# Patient Record
Sex: MALE | Race: AMERICAN INDIAN OR ALASKA NATIVE
[De-identification: names, ages, dates, MRNs, and addresses within clinical notes are randomized per-mention and may not be internally consistent; named-entity substitution may affect disease eponyms.]

---

## 2021-07-27 NOTE — XRAY REPORT
RIGHT HAND 3 VIEW(S)



INDICATION / CLINICAL INFORMATION: right index finger pain swelling erythema



COMPARISON: None available.

 

FINDINGS:



BONES / JOINT(S): No acute fracture or subluxation. No significant arthritis.



SOFT TISSUES: No significant abnormality.



ADDITIONAL FINDINGS: None.







Signer Name: Clay Marie MD 

Signed: 7/27/2021 9:20 PM

Workstation Name: [id number]

## 2021-07-27 NOTE — EMERGENCY DEPARTMENT REPORT
Upper Extremity





- HPI


Chief Complaint: Extremity Injury, Upper


Stated Complaint: RIGHT HAND INJURY


Time Seen by Provider: 21 22:13


Upper Extremity: Right Ring Finger


Occurred When: 3 Days


Mechanism: Hit with Object, Other (Times dailyRegion with the tip of his finger 

resulting in pain with flexion to the distal interphalangeal joint region)


Symptoms: Yes Pain with Movement, Yes Deformity, Yes Limited Range of Movement, 

Yes Swelling, No Numbness, No Weakness, No Bruising/Ecchymosis, No Laceration or

Abrasion





ED Review of Systems


ROS: 


Stated complaint: RIGHT HAND INJURY


Other details as noted in HPI





Comment: All other systems reviewed and negative





ED Past Medical Hx





- Medications


Home Medications: 


                                Home Medications











 Medication  Instructions  Recorded  Confirmed  Last Taken  Type


 


Ketorolac [Toradol] 10 mg PO Q6H PRN #14 tablet 21  Unknown Rx














Upper Extremity Exam





- Exam


General: 


Vital signs noted. No distress. Alert and acting appropriately.





Head and Torso: No HEENT Abnormality, No Neck Tenderness, No Chest/Lungs 

Abnormality, No Abdominal Tenderness, No Back Tenderness


Shoulder Exam: Yes Normal Range of Motion in Shoulder, No Shoulder Tenderness, 

No Clavicle Tenderness, No Shoulder Deformity, No AC Joint Tenderness


Arm Exam: No Arm/Humerus Tenderness, No Arm Deformity


Elbow: No Elbow Tenderness, No Normal Range of Motion in Elbow, No Elbow 

Deformity


Forearm: No Forearm Tenderness, No Forearm Deformity, No Pain with Pronation, No

 Pain with Supination


Wrist: Yes Normal ROM in Wrist, No Wrist Tenderness, No Wrist Deformity, No 

Snuffbox Tenderness, No Pain with Axial Thumb Compression


Hand: Yes Hand Tenderness, Yes Hand Deformity, Yes Normal ROM in Digit(s), Yes 

Digit(s) Deformity (At the distal interphalangeal joint region in a partially 

flexed position unable to extend), No Digit Tenderness, No Tendon Dysfunction


CMS Exam: No Broken Skin, No Normal Distal Pulses, No Normal Capillary Refill, 

No Normal Distal Sensation





ED Course


                                   Vital Signs











  21





  20:49


 


Temperature 98.8 F


 


Pulse Rate 63


 


Respiratory 18





Rate 


 


Blood Pressure 139/82





[Right] 


 


O2 Sat by Pulse 99





Oximetry 














ED Medical Decision Making





- Radiology Data


Radiology results: report reviewed





[facility]  


                                     [address] [place] [address]  


 


                                            XRay Report   


                                               Signed  


 


Patient: MARIBEL STAFFORD                                                           

     MR#: [id number]  


 


: 1970                                                                

Acct:[id number]      


 


Age/Sex: 51 / M                                                                

ADM Date: 21     


 


Loc: ED       


Attending Dr:   


 


 


Ordering Physician: JASMYN NOBLE NP  


Date of Service: 21  


Procedure(s): XR hand 3+V RT  


Accession Number(s): [id number]  


 


cc: JASMYN NOBLE NP   


 


Fluoro Time In Minutes:   


 


RIGHT HAND 3 VIEW(S)  


 


 INDICATION / CLINICAL INFORMATION: right index finger pain swelling erythema  


 


 COMPARISON: None available.  


 


 FINDINGS:  


 


 BONES / JOINT(S): No acute fracture or subluxation. No significant arthritis.  


 


 SOFT TISSUES: No significant abnormality.  


 


 ADDITIONAL FINDINGS: None.  


 


 


 


 Signer Name: Clay Marie MD   


 Signed: 2021 9:20 PM  


 Workstation Name: [id number]   


 


 


Transcribed By: SB  


Dictated By: CLAY MARIE MD  


Electronically Authenticated By: CLAY MARIE MD    


Signed Date/Time: 21                                


 


 


 


DD/DT: 21                                                            

  


TD/TT:





- Medical Decision Making





51-year-old male from his keys struck the tip of his finger which resulted in 

pain to the distal left phalangeal joint with some swelling a couple days ago 

which is not yet resolved.  Presents emerge department wearing a brace for the 

finger to be evaluated for possible fracture.


Critical care attestation.: 


If time is entered above; I have spent that time in minutes in the direct care 

of this critically ill patient, excluding procedure time.








ED Disposition


Clinical Impression: 


 Mallet deformity of right ring finger





Disposition: DC-01 TO HOME OR SELFCARE


Is pt being admited?: No


Does the pt Need Aspirin: No


Condition: Stable


Instructions:  Mallet Finger


Additional Instructions: 


Ice anti-inflammatories follow-up with with orthopedic


Prescriptions: 


Ketorolac [Toradol] 10 mg PO Q6H PRN #14 tablet


 PRN Reason: Pain


Referrals: 


[facility] MD [Primary Care Provider] - 3-5 Days


ARMEN BAIRES MD [Staff Physician] - 3-5 Days

## 2021-07-27 NOTE — EVENT NOTE
ED Screening Note


Date of service: 07/27/21


Time: 20:55


ED Screening Note: 


Patient 51-year-old black -American male who presents for right index 

finger pain swelling and deformity x3 days.  States he struck his finger on a 

metal pipe and now with swelling and erythema no drainage.  Patient describes 

throbbing as 7/10.





This initial assessment/diagnostic orders/clinical plan/treatment(s) is/are 

subject to change based on patients health status, clinical progression and re-

assessment by fellow clinical providers in the ED. Further treatment and workup 

at subsequent clinical providers discretion. Patient/guardian urged not to elope

from the ED as their condition may be serious if not clinically assessed and 

managed. 





Initial orders include:

## 2022-07-19 ENCOUNTER — HOSPITAL ENCOUNTER (OUTPATIENT)
Dept: HOSPITAL 5 - ED | Age: 52
Setting detail: OBSERVATION
LOS: 1 days | Discharge: HOME | End: 2022-07-20
Attending: STUDENT IN AN ORGANIZED HEALTH CARE EDUCATION/TRAINING PROGRAM | Admitting: INTERNAL MEDICINE
Payer: COMMERCIAL

## 2022-07-19 DIAGNOSIS — Z79.82: ICD-10-CM

## 2022-07-19 DIAGNOSIS — R07.89: ICD-10-CM

## 2022-07-19 DIAGNOSIS — I21.4: ICD-10-CM

## 2022-07-19 DIAGNOSIS — Z79.899: ICD-10-CM

## 2022-07-19 DIAGNOSIS — F17.210: ICD-10-CM

## 2022-07-19 DIAGNOSIS — I20.0: Primary | ICD-10-CM

## 2022-07-19 LAB
ALBUMIN SERPL-MCNC: 4 G/DL (ref 3.9–5)
ALT SERPL-CCNC: 15 UNITS/L (ref 7–56)
BASOPHILS # (AUTO): 0.1 K/MM3 (ref 0–0.1)
BASOPHILS NFR BLD AUTO: 1.5 % (ref 0–1.8)
BUN SERPL-MCNC: 8 MG/DL (ref 9–20)
BUN SERPL-MCNC: 9 MG/DL (ref 9–20)
BUN/CREAT SERPL: 10 %
BUN/CREAT SERPL: 11 %
CALCIUM SERPL-MCNC: 9.6 MG/DL (ref 8.4–10.2)
CALCIUM SERPL-MCNC: 9.8 MG/DL (ref 8.4–10.2)
EOSINOPHIL # BLD AUTO: 0 K/MM3 (ref 0–0.4)
EOSINOPHIL NFR BLD AUTO: 0.8 % (ref 0–4.3)
HCT VFR BLD CALC: 42.2 % (ref 35.5–45.6)
HCT VFR BLD CALC: 43.6 % (ref 35.5–45.6)
HDLC SERPL-MCNC: 83 MG/DL (ref 40–59)
HEMOLYSIS INDEX: 3
HEMOLYSIS INDEX: 4
HGB BLD-MCNC: 14.3 GM/DL (ref 11.8–15.2)
HGB BLD-MCNC: 14.8 GM/DL (ref 11.8–15.2)
LYMPHOCYTES # BLD AUTO: 2.4 K/MM3 (ref 1.2–5.4)
LYMPHOCYTES NFR BLD AUTO: 47.9 % (ref 13.4–35)
MCHC RBC AUTO-ENTMCNC: 34 % (ref 32–34)
MCHC RBC AUTO-ENTMCNC: 34 % (ref 32–34)
MCV RBC AUTO: 97 FL (ref 84–94)
MCV RBC AUTO: 99 FL (ref 84–94)
MONOCYTES # (AUTO): 0.7 K/MM3 (ref 0–0.8)
MONOCYTES % (AUTO): 13.9 % (ref 0–7.3)
PLATELET # BLD: 265 K/MM3 (ref 140–440)
PLATELET # BLD: 271 K/MM3 (ref 140–440)
RBC # BLD AUTO: 4.34 M/MM3 (ref 3.65–5.03)
RBC # BLD AUTO: 4.42 M/MM3 (ref 3.65–5.03)

## 2022-07-19 PROCEDURE — 85025 COMPLETE CBC W/AUTO DIFF WBC: CPT

## 2022-07-19 PROCEDURE — 85730 THROMBOPLASTIN TIME PARTIAL: CPT

## 2022-07-19 PROCEDURE — 96367 TX/PROPH/DG ADDL SEQ IV INF: CPT

## 2022-07-19 PROCEDURE — 93005 ELECTROCARDIOGRAM TRACING: CPT

## 2022-07-19 PROCEDURE — C8929 TTE W OR WO FOL WCON,DOPPLER: HCPCS

## 2022-07-19 PROCEDURE — 96376 TX/PRO/DX INJ SAME DRUG ADON: CPT

## 2022-07-19 PROCEDURE — 85610 PROTHROMBIN TIME: CPT

## 2022-07-19 PROCEDURE — 93306 TTE W/DOPPLER COMPLETE: CPT

## 2022-07-19 PROCEDURE — 99285 EMERGENCY DEPT VISIT HI MDM: CPT

## 2022-07-19 PROCEDURE — 84484 ASSAY OF TROPONIN QUANT: CPT

## 2022-07-19 PROCEDURE — C1894 INTRO/SHEATH, NON-LASER: HCPCS

## 2022-07-19 PROCEDURE — 96366 THER/PROPH/DIAG IV INF ADDON: CPT

## 2022-07-19 PROCEDURE — 96375 TX/PRO/DX INJ NEW DRUG ADDON: CPT

## 2022-07-19 PROCEDURE — 85007 BL SMEAR W/DIFF WBC COUNT: CPT

## 2022-07-19 PROCEDURE — 80048 BASIC METABOLIC PNL TOTAL CA: CPT

## 2022-07-19 PROCEDURE — 80053 COMPREHEN METABOLIC PANEL: CPT

## 2022-07-19 PROCEDURE — 71046 X-RAY EXAM CHEST 2 VIEWS: CPT

## 2022-07-19 PROCEDURE — 93458 L HRT ARTERY/VENTRICLE ANGIO: CPT

## 2022-07-19 PROCEDURE — 80061 LIPID PANEL: CPT

## 2022-07-19 PROCEDURE — 96365 THER/PROPH/DIAG IV INF INIT: CPT

## 2022-07-19 PROCEDURE — G0378 HOSPITAL OBSERVATION PER HR: HCPCS

## 2022-07-19 PROCEDURE — 36415 COLL VENOUS BLD VENIPUNCTURE: CPT

## 2022-07-19 PROCEDURE — 85520 HEPARIN ASSAY: CPT

## 2022-07-19 NOTE — ELECTROCARDIOGRAPH REPORT
[facility]

                                       

Test Date:    2022               Test Time:    05:30:41

Pat Name:     MARIBEL STAFFORD             Department:   

Patient ID:   [id number]          Room:          

Gender:       M                        Technician:   MB

:          1970               Requested By: ED DOC

Order Number: [id number]              Reading MD:   Santiago Cordoba

                                 Measurements

Intervals                              Axis          

Rate:         56                       P:            24

AK:           106                      QRS:          82

QRSD:         79                       T:            119

QT:           538                                    

QTc:          520                                    

                           Interpretive Statements

Sinus rhythm

Anteroseptal infarct, age indeterminate

Abnormal T, consider ischemia, lateral leads

Prolonged QT interval

No previous ECG available for comparison

Electronically Signed On 2022 18:58:26 EDT by Santiago Cordoba

## 2022-07-19 NOTE — HISTORY AND PHYSICAL REPORT
History of Present Illness


Date of examination: 07/19/22


Date of admission: 


07/19/2022


Chief complaint: 





Chest pain


History of present illness: 





52-year-old -American male with no significant past medical history 

presenting to the emergency room today complaining of chest pain.  Chest pain 

felt like pressure in the midsternal region radiating to both upper extremities.

 There has been no known relieving or exacerbating factor.  Denies any nausea or

vomiting and no abdominal pain.  Denies any headache or dizziness no 

diaphoresis.  Patient denies having similar symptoms in the past.





Patient admits that he smokes more than a pack of cigarettes on significant 

findings were that of 1 daily basis.  He has never had any cardiac work-up.





Work-up in the emergency room today, significant findings were slightly elevated

troponin of 0.77.  EKG shows some T wave depression in V2.


Chest x-ray was unremarkable.





Patient has been admitted for NSTEMI.





Past History


Past Medical History: No medical history


Past Surgical History: No surgical history


Social history: smoking (Current daily smoker)


Family history: no significant family history





Medications and Allergies


                                    Allergies











Allergy/AdvReac Type Severity Reaction Status Date / Time


 


No Known Allergies Allergy   Verified 07/27/21 21:57











                                Home Medications











 Medication  Instructions  Recorded  Confirmed  Last Taken  Type


 


Ketorolac [Toradol] 10 mg PO Q6H PRN #14 tablet 07/27/21  Unknown Rx











Active Meds: 


Active Medications





Acetaminophen (Acetaminophen 325 Mg Tab)  650 mg PO Q4H PRN


   PRN Reason: Pain MILD(1-3)/Fever >100.5/HA


Aspirin (Aspirin Ec 325 Mg Tab)  325 mg PO QDAY FIDELINA


Magnesium Hydroxide (Magnesium Hydroxide (Mom) Oral Liqd Udc)  30 ml PO Q4H PRN


   PRN Reason: Constipation


Morphine Sulfate (Morphine 2 Mg/1 Ml Inj)  2 mg IV Q4H PRN


   PRN Reason: Pain, Moderate (4-6)


Morphine Sulfate (Morphine 4 Mg/1 Ml Inj)  4 mg IV Q4H PRN


   PRN Reason: Pain , Severe (7-10)


Morphine Sulfate (Morphine 4 Mg/1 Ml Inj)  2 mg IV Q5MIN PRN


   PRN Reason: Chest Pain unrelieved by NTG


Nitroglycerin (Nitroglycerin 0.4 Mg Tab Subl)  0.4 mg SL Q5M PRN


   PRN Reason: Chest Pain


Ondansetron HCl (Ondansetron 4 Mg/2 Ml Inj)  4 mg IV Q8H PRN


   PRN Reason: Nausea And Vomiting


Sodium Chloride (Sodium Chloride 0.9% 10 Ml Flush Syringe)  10 ml IV BID FIDELINA


Sodium Chloride (Sodium Chloride 0.9% 10 Ml Flush Syringe)  10 ml IV PRN PRN


   PRN Reason: LINE FLUSH


Sodium Chloride (Sodium Chloride 0.9% 10 Ml Flush Syringe)  10 ml IV PRN PRN


   PRN Reason: LINE FLUSH


Tramadol HCl (Tramadol 50 Mg Tab)  50 mg PO Q6H PRN


   PRN Reason: Pain, Moderate (4-6)











Review of Systems


Constitutional: no fever, no chills


Ears, nose, mouth and throat: no nasal congestion, no sore throat


Cardiovascular: chest pain, no palpitations


Respiratory: no cough, no shortness of breath


Gastrointestinal: no abdominal pain, no nausea, no vomiting, no diarrhea


Genitourinary Male: no dysuria, no hematuria, no nocturia


Musculoskeletal: no neck pain, no low back pain


Integumentary: no rash, no pruritis


Psychiatric: no anxiety, no depression


Endocrine: no polyphagia, no polydipsia, no polyuria, no nocturia





Exam





- Constitutional


Vitals: 


                                        











Temp Pulse Resp BP Pulse Ox


 


 98.0 F   53 L  17   129/87   99 


 


 07/19/22 05:28  07/19/22 22:01  07/19/22 22:01  07/19/22 22:01  07/19/22 22:01











General appearance: Present: no acute distress, well-nourished





- EENT


Eyes: Present: PERRL, EOM intact.  Absent: scleral icterus


ENT: hearing intact, clear oral mucosa, dentition normal





- Neck


Neck: Present: supple, normal ROM





- Respiratory


Respiratory effort: normal


Respiratory: bilateral: CTA





- Cardiovascular


Rhythm: regular


Heart Sounds: Present: S1 & S2.  Absent: gallop, systolic murmur, diastolic 

murmur, rub, click





- Extremities


Extremities: no ischemia, pulses intact, pulses symmetrical, No edema, normal 

temperature, Full ROM


Peripheral Pulses: within normal limits





- Abdominal


General gastrointestinal: Present: soft, non-tender, non-distended, normal bowel

 sounds.  Absent: mass





- Integumentary


Integumentary: Present: clear, warm, dry, normal turgor.  Absent: rash





- Musculoskeletal


Musculoskeletal: strength equal bilaterally





- Psychiatric


Psychiatric: appropriate mood/affect, intact judgment & insight, memory intact, 

cooperative





- Neurologic


Neurologic: CNII-XII intact, no focal deficits, moves all extremities





HEART Score





- HEART Score


History: Moderately suspicious


EKG: Non-specific


Age: 45-65


Risk factors: 1-2 risk factors


Troponin: 


                                        











Troponin T  0.018 ng/mL (0.00-0.029)   07/19/22  17:41    











Troponin: 1-3x normal limit


HEART Score: 5





Results





- Labs


CBC & Chem 7: 


                                 07/19/22 23:07





                                 07/19/22 23:07


Labs: 


                              Abnormal lab results











  07/19/22 07/19/22 07/19/22 Range/Units





  06:04 06:04 08:29 


 


MCV  97 H    (84-94)  fl


 


MCH  33 H    (28-32)  pg


 


Lymph % (Auto)  47.9 H    (13.4-35.0)  %


 


Mono % (Auto)  13.9 H    (0.0-7.3)  %


 


Seg Neutrophils %  35.9 L    (40.0-70.0)  %


 


BUN   8 L   (9-20)  mg/dL


 


Glucose   108 H   ()  mg/dL


 


Troponin T   0.077 H  0.069 H  (0.00-0.029)  ng/mL


 


HDL Cholesterol   83 H   (40-59)  mg/dL














Assessment and Plan





Assessment:





1.  Chest pain





2.  NSTEMI





3.  Tobacco abuse








Plan:





1.  Patient admitted and placed on telemetry.  We will check serial cardiac 

enzymes.





2.  Patient will be scheduled for stress test and echocardiogram.





3.  Counseled on quitting tobacco use.





4.  Consult placed to cardiology for evaluation.








DVT prophylaxis: Patient currently on heparin drip.








CODE STATUS: Full code

## 2022-07-19 NOTE — EMERGENCY DEPARTMENT REPORT
ED Chest Pain HPI





- General


Chief Complaint: Chest Pain


Stated Complaint: CHEST PAIN


Time Seen by Provider: 07/19/22 20:49


Source: patient


Mode of arrival: Ambulatory


Limitations: No Limitations





- History of Present Illness


Initial Comments: 


Who presents with chest pain that has been going on for the last 3 days.  

Patient states chest pain is moderate is a pressure feeling on his chest he 

states that it radiates to his arms.  He has no nausea or vomiting.  He states 

he has never had chest pain like this before in the past.





Severity scale (0 -10): 6





- Related Data


                                  Previous Rx's











 Medication  Instructions  Recorded  Last Taken  Type


 


Ketorolac [Toradol] 10 mg PO Q6H PRN #14 tablet 07/27/21 Unknown Rx











                                    Allergies











Allergy/AdvReac Type Severity Reaction Status Date / Time


 


No Known Allergies Allergy   Verified 07/27/21 21:57














Heart Score





- HEART Score


History: Moderately suspicious


EKG: Non-specific


Age: 45-65


Risk factors: 1-2 risk factors


Troponin: 1-3x normal limit


HEART Score: 5





- EKG Read Time


Time EKG Completed: 10:00


EKG Read Time: 10:15





ED Review of Systems


ROS: 


Stated complaint: CHEST PAIN


Other details as noted in HPI





Constitutional: denies: chills, fever


Eyes: denies: eye pain, eye discharge, vision change


ENT: denies: ear pain, throat pain


Respiratory: denies: cough, shortness of breath, wheezing


Cardiovascular: chest pain.  denies: palpitations


Endocrine: no symptoms reported


Gastrointestinal: denies: abdominal pain, nausea, diarrhea


Genitourinary: denies: urgency, dysuria


Musculoskeletal: denies: back pain, joint swelling, arthralgia


Skin: denies: rash, lesions


Neurological: denies: headache, weakness, paresthesias


Psychiatric: denies: anxiety, depression


Hematological/Lymphatic: denies: easy bleeding, easy bruising





ED Past Medical Hx





- Medications


Home Medications: 


                                Home Medications











 Medication  Instructions  Recorded  Confirmed  Last Taken  Type


 


Ketorolac [Toradol] 10 mg PO Q6H PRN #14 tablet 07/27/21  Unknown Rx














ED Physical Exam





- General


Limitations: No Limitations


General appearance: alert, in no apparent distress





- Head


Head exam: Present: atraumatic, normocephalic





- Eye


Eye exam: Present: normal appearance





- ENT


ENT exam: Present: mucous membranes moist





- Neck


Neck exam: Present: normal inspection





- Respiratory


Respiratory exam: Present: normal lung sounds bilaterally.  Absent: respiratory 

distress





- Cardiovascular


Cardiovascular Exam: Present: regular rate, normal rhythm.  Absent: systolic 

murmur, diastolic murmur, rubs, gallop





- GI/Abdominal


GI/Abdominal exam: Present: soft, normal bowel sounds





- Rectal


Rectal exam: Present: deferred





- Extremities Exam


Extremities exam: Present: normal inspection





- Back Exam


Back exam: Present: normal inspection





- Neurological Exam


Neurological exam: Present: alert, oriented X3





- Psychiatric


Psychiatric exam: Present: normal affect, normal mood





- Skin


Skin exam: Present: warm, dry, intact, normal color.  Absent: rash





ED Course


                                   Vital Signs











  07/19/22 07/19/22 07/19/22





  05:28 19:21 19:31


 


Temperature 98.0 F  


 


Pulse Rate 62 64 62


 


Respiratory 20 22 19





Rate   


 


Blood Pressure 159/92  


 


O2 Sat by Pulse 100 100 99





Oximetry   














  07/19/22 07/19/22 07/19/22





  19:45 20:01 20:15


 


Temperature   


 


Pulse Rate 68 65 65


 


Respiratory 17 14 15





Rate   


 


Blood Pressure  128/88 128/88


 


O2 Sat by Pulse 99 99 99





Oximetry   














  07/19/22 07/19/22 07/19/22





  20:31 20:45 21:01


 


Temperature   


 


Pulse Rate 64 64 59 L


 


Respiratory 12 12 21





Rate   


 


Blood Pressure 128/88 128/88 129/87


 


O2 Sat by Pulse 99 99 100





Oximetry   














  07/19/22 07/19/22 07/19/22





  21:15 21:31 21:45


 


Temperature   


 


Pulse Rate 61 63 53 L


 


Respiratory 15 16 15





Rate   


 


Blood Pressure 129/87 129/87 129/87


 


O2 Sat by Pulse 99 99 98





Oximetry   














  07/19/22





  22:01


 


Temperature 


 


Pulse Rate 53 L


 


Respiratory 17





Rate 


 


Blood Pressure 129/87


 


O2 Sat by Pulse 99





Oximetry 














OH score





- Oh Score


Age > 65: (0) No


Aspirin use within the Past 7 Days: (0) No


3 or more CAD Risk Factors: (0) No


2 or more Angina events in past 24 hrs: (0) No


Known CAD with more than 50% Stenosis: (0) No


Elevated Cardiac Markers: (1) Yes


ST Deviation Greater than 0.5mm: (0) No


OH Score: 1





ED Medical Decision Making





- Lab Data


Result diagrams: 


                                 07/19/22 06:04





                                 07/19/22 06:04








                                   Lab Results











  07/19/22 07/19/22 07/19/22 Range/Units





  06:04 06:04 08:29 


 


WBC  4.9    (4.5-11.0)  K/mm3


 


RBC  4.34    (3.65-5.03)  M/mm3


 


Hgb  14.3    (11.8-15.2)  gm/dl


 


Hct  42.2    (35.5-45.6)  %


 


MCV  97 H    (84-94)  fl


 


MCH  33 H    (28-32)  pg


 


MCHC  34    (32-34)  %


 


RDW  13.8    (13.2-15.2)  %


 


Plt Count  265    (140-440)  K/mm3


 


Lymph % (Auto)  47.9 H    (13.4-35.0)  %


 


Mono % (Auto)  13.9 H    (0.0-7.3)  %


 


Eos % (Auto)  0.8    (0.0-4.3)  %


 


Baso % (Auto)  1.5    (0.0-1.8)  %


 


Lymph # (Auto)  2.4    (1.2-5.4)  K/mm3


 


Mono # (Auto)  0.7    (0.0-0.8)  K/mm3


 


Eos # (Auto)  0.0    (0.0-0.4)  K/mm3


 


Baso # (Auto)  0.1    (0.0-0.1)  K/mm3


 


Seg Neutrophils %  35.9 L    (40.0-70.0)  %


 


Seg Neutrophils #  1.8    (1.8-7.7)  K/mm3


 


Sodium   141   (137-145)  mmol/L


 


Potassium   4.0   (3.6-5.0)  mmol/L


 


Chloride   103.1   ()  mmol/L


 


Carbon Dioxide   23   (22-30)  mmol/L


 


Anion Gap   19   mmol/L


 


BUN   8 L   (9-20)  mg/dL


 


Creatinine   0.8   (0.8-1.3)  mg/dL


 


Estimated GFR   > 60   ml/min


 


BUN/Creatinine Ratio   10   %


 


Glucose   108 H   ()  mg/dL


 


Calcium   9.6   (8.4-10.2)  mg/dL


 


Total Bilirubin   0.60   (0.1-1.2)  mg/dL


 


AST   29   (5-40)  units/L


 


ALT   15   (7-56)  units/L


 


Alkaline Phosphatase   57   ()  units/L


 


Troponin T   0.077 H  0.069 H  (0.00-0.029)  ng/mL


 


Total Protein   6.9   (6.3-8.2)  g/dL


 


Albumin   4.0   (3.9-5)  g/dL


 


Albumin/Globulin Ratio   1.4   %


 


Triglycerides   87   (2-149)  mg/dL


 


Cholesterol   179   ()  mg/dL


 


LDL Cholesterol Direct   94   ()  mg/dL


 


HDL Cholesterol   83 H   (40-59)  mg/dL


 


Cholesterol/HDL Ratio   2.15   %














  07/19/22 Range/Units





  17:41 


 


WBC   (4.5-11.0)  K/mm3


 


RBC   (3.65-5.03)  M/mm3


 


Hgb   (11.8-15.2)  gm/dl


 


Hct   (35.5-45.6)  %


 


MCV   (84-94)  fl


 


MCH   (28-32)  pg


 


MCHC   (32-34)  %


 


RDW   (13.2-15.2)  %


 


Plt Count   (140-440)  K/mm3


 


Lymph % (Auto)   (13.4-35.0)  %


 


Mono % (Auto)   (0.0-7.3)  %


 


Eos % (Auto)   (0.0-4.3)  %


 


Baso % (Auto)   (0.0-1.8)  %


 


Lymph # (Auto)   (1.2-5.4)  K/mm3


 


Mono # (Auto)   (0.0-0.8)  K/mm3


 


Eos # (Auto)   (0.0-0.4)  K/mm3


 


Baso # (Auto)   (0.0-0.1)  K/mm3


 


Seg Neutrophils %   (40.0-70.0)  %


 


Seg Neutrophils #   (1.8-7.7)  K/mm3


 


Sodium   (137-145)  mmol/L


 


Potassium   (3.6-5.0)  mmol/L


 


Chloride   ()  mmol/L


 


Carbon Dioxide   (22-30)  mmol/L


 


Anion Gap   mmol/L


 


BUN   (9-20)  mg/dL


 


Creatinine   (0.8-1.3)  mg/dL


 


Estimated GFR   ml/min


 


BUN/Creatinine Ratio   %


 


Glucose   ()  mg/dL


 


Calcium   (8.4-10.2)  mg/dL


 


Total Bilirubin   (0.1-1.2)  mg/dL


 


AST   (5-40)  units/L


 


ALT   (7-56)  units/L


 


Alkaline Phosphatase   ()  units/L


 


Troponin T  0.018  (0.00-0.029)  ng/mL


 


Total Protein   (6.3-8.2)  g/dL


 


Albumin   (3.9-5)  g/dL


 


Albumin/Globulin Ratio   %


 


Triglycerides   (2-149)  mg/dL


 


Cholesterol   ()  mg/dL


 


LDL Cholesterol Direct   ()  mg/dL


 


HDL Cholesterol   (40-59)  mg/dL


 


Cholesterol/HDL Ratio   %














- EKG Data


-: EKG Interpreted by Me





- EKG Data





07/19/22 23:26


EKG time 22: 01 rate 52 sinus bradycardia anteroseptal infarct T wave inversions

 in V2 and V T3 prolonged QT interval impression abnormal EKG





- Radiology Data


Radiology results: report reviewed





Chest x-ray: Shows no acute cardiopulmonary disease





- Medical Decision Making


Chief medical diagnosis: Non-STEMI


Differential medical diagnosis: Arrhythmia, acute coronary syndrome





I will get 3 sets of troponin I will admit patient to the hospital I will give 

patient aspirin morphine I will reevaluate the patient





Critical care attestation.: 


If time is entered above; I have spent that time in minutes in the direct care 

of this critically ill patient, excluding procedure time.








ED Disposition


Clinical Impression: 


 Non-STEMI (non-ST elevated myocardial infarction)





Disposition: 09 ADMITTED AS INPATIENT


Is pt being admited?: Yes


Does the pt Need Aspirin: No


Condition: Stable

## 2022-07-19 NOTE — XRAY REPORT
CHEST 2 VIEWS 



INDICATION / CLINICAL INFORMATION: CHEST PAIN. 



COMPARISON: None available.



FINDINGS:



SUPPORT DEVICES: None.

HEART / MEDIASTINUM: Heart size and mediastinal contour appear within normal limits. 

LUNGS / PLEURA: No significant pulmonary or pleural abnormality. No pneumothorax. 

BONES: No significant osseous abnormality.

ADDITIONAL FINDINGS: No significant additional findings.



IMPRESSION:

1. No active cardiopulmonary disease.



Signer Name: Mathieu Soto II, MD 

Signed: 7/19/2022 6:20 AM

Workstation Name: [id number]

## 2022-07-19 NOTE — EVENT NOTE
Date: 07/19/22 (0743)





elevated troponin noted


charge nurse - no answer on phone


unit clerk and MD aware

## 2022-07-20 VITALS — SYSTOLIC BLOOD PRESSURE: 140 MMHG | DIASTOLIC BLOOD PRESSURE: 92 MMHG

## 2022-07-20 LAB
APTT BLD: 27.2 SEC. (ref 24.2–36.6)
BAND NEUTROPHILS # (MANUAL): 0 K/MM3
BAND NEUTROPHILS # (MANUAL): 0.1 K/MM3
BUN SERPL-MCNC: 10 MG/DL (ref 9–20)
BUN/CREAT SERPL: 14 %
CALCIUM SERPL-MCNC: 9 MG/DL (ref 8.4–10.2)
HCT VFR BLD CALC: 42.8 % (ref 35.5–45.6)
HEMOLYSIS INDEX: 24
HGB BLD-MCNC: 14.5 GM/DL (ref 11.8–15.2)
INR PPP: 0.82 (ref 0.87–1.13)
MCHC RBC AUTO-ENTMCNC: 34 % (ref 32–34)
MCV RBC AUTO: 97 FL (ref 84–94)
MYELOCYTES # (MANUAL): 0 K/MM3
MYELOCYTES # (MANUAL): 0 K/MM3
PLATELET # BLD: 245 K/MM3 (ref 140–440)
PROMYELOCYTES # (MANUAL): 0 K/MM3
PROMYELOCYTES # (MANUAL): 0 K/MM3
RBC # BLD AUTO: 4.4 M/MM3 (ref 3.65–5.03)
TOTAL CELLS COUNTED BLD: 100
TOTAL CELLS COUNTED BLD: 100

## 2022-07-20 RX ADMIN — NITROGLYCERIN ONE MLS: 20 INJECTION INTRAVENOUS at 12:27

## 2022-07-20 RX ADMIN — HEPARIN SODIUM ONE UNIT: 1000 INJECTION, SOLUTION INTRAVENOUS; SUBCUTANEOUS at 12:04

## 2022-07-20 RX ADMIN — LIDOCAINE HYDROCHLORIDE ONE ML: 10 INJECTION, SOLUTION INFILTRATION; PERINEURAL at 12:04

## 2022-07-20 RX ADMIN — LIDOCAINE HYDROCHLORIDE ONE ML: 10 INJECTION, SOLUTION INFILTRATION; PERINEURAL at 12:24

## 2022-07-20 RX ADMIN — MIDAZOLAM ONE MG: 1 INJECTION INTRAMUSCULAR; INTRAVENOUS at 12:09

## 2022-07-20 RX ADMIN — NITROGLYCERIN ONE MLS: 20 INJECTION INTRAVENOUS at 12:05

## 2022-07-20 RX ADMIN — VERAPAMIL HYDROCHLORIDE ONE MG: 2.5 INJECTION INTRAVENOUS at 13:27

## 2022-07-20 RX ADMIN — SODIUM CHLORIDE SCH MLS/HR: 0.9 INJECTION, SOLUTION INTRAVENOUS at 11:18

## 2022-07-20 RX ADMIN — LIDOCAINE HYDROCHLORIDE ONE ML: 10 INJECTION, SOLUTION INFILTRATION; PERINEURAL at 13:24

## 2022-07-20 RX ADMIN — SODIUM CHLORIDE SCH MLS: 0.9 INJECTION, SOLUTION INTRAVENOUS at 12:05

## 2022-07-20 RX ADMIN — FENTANYL CITRATE ONE MCG: 50 INJECTION, SOLUTION INTRAMUSCULAR; INTRAVENOUS at 12:09

## 2022-07-20 RX ADMIN — VERAPAMIL HYDROCHLORIDE ONE MG: 2.5 INJECTION INTRAVENOUS at 12:27

## 2022-07-20 RX ADMIN — FENTANYL CITRATE ONE MCG: 50 INJECTION, SOLUTION INTRAMUSCULAR; INTRAVENOUS at 13:24

## 2022-07-20 RX ADMIN — MIDAZOLAM ONE MG: 1 INJECTION INTRAMUSCULAR; INTRAVENOUS at 13:24

## 2022-07-20 RX ADMIN — VERAPAMIL HYDROCHLORIDE ONE MG: 2.5 INJECTION INTRAVENOUS at 12:04

## 2022-07-20 RX ADMIN — HEPARIN SODIUM ONE UNIT: 1000 INJECTION, SOLUTION INTRAVENOUS; SUBCUTANEOUS at 12:27

## 2022-07-20 RX ADMIN — HEPARIN SODIUM ONE UNIT: 1000 INJECTION, SOLUTION INTRAVENOUS; SUBCUTANEOUS at 13:27

## 2022-07-20 NOTE — EVENT NOTE
Date: 07/20/22





Cardiac catheterization completed via the right radial approach, no 

complications.  We found essentially angiographically near normal coronary 

arteries.  Left ventricular systolic ejection fraction was about 50%, with 

evidence of mild hypokinesis of the mid anterior wall.





Recommendations:


Patient may be discharged on aspirin 81, Plavix 75, lisinopril 5, Imdur 30 mg.


I am holding off on beta-blocker therapy due to resting bradycardia.


Outpatient cardiac follow-up in 7 days.

## 2022-07-20 NOTE — PROGRESS NOTE
Hospitalist Physical





- Constitutional


Vitals: 


                                        











Temp Pulse Resp BP Pulse Ox


 


 97.9 F   53 L  16   152/87   100 


 


 07/20/22 08:01  07/20/22 08:01  07/20/22 08:01  07/20/22 08:01  07/20/22 08:01











General appearance: Present: no acute distress





HEART Score





- HEART Score


EKG: Non-specific


Age: 45-65


Risk factors: 1-2 risk factors


Troponin: 


                                        











Troponin T  0.015 ng/mL (0.00-0.029)   07/20/22  04:32    











Troponin: 1-3x normal limit





Results





- Labs


CBC & Chem 7: 


                                 07/20/22 04:32





                                 07/20/22 04:32


Labs: 


                             Laboratory Last Values











WBC  5.2 K/mm3 (4.5-11.0)   07/20/22  04:32    


 


RBC  4.40 M/mm3 (3.65-5.03)   07/20/22  04:32    


 


Hgb  14.5 gm/dl (11.8-15.2)   07/20/22  04:32    


 


Hct  42.8 % (35.5-45.6)   07/20/22  04:32    


 


MCV  97 fl (84-94)  H  07/20/22  04:32    


 


MCH  33 pg (28-32)  H  07/20/22  04:32    


 


MCHC  34 % (32-34)   07/20/22  04:32    


 


RDW  14.0 % (13.2-15.2)   07/20/22  04:32    


 


Plt Count  245 K/mm3 (140-440)   07/20/22  04:32    


 


Lymph % (Auto)  47.9 % (13.4-35.0)  H  07/19/22  06:04    


 


Mono % (Auto)  13.9 % (0.0-7.3)  H  07/19/22  06:04    


 


Eos % (Auto)  0.8 % (0.0-4.3)   07/19/22  06:04    


 


Baso % (Auto)  1.5 % (0.0-1.8)   07/19/22  06:04    


 


Lymph # (Auto)  2.4 K/mm3 (1.2-5.4)   07/19/22  06:04    


 


Mono # (Auto)  0.7 K/mm3 (0.0-0.8)   07/19/22  06:04    


 


Eos # (Auto)  0.0 K/mm3 (0.0-0.4)   07/19/22  06:04    


 


Baso # (Auto)  0.1 K/mm3 (0.0-0.1)   07/19/22  06:04    


 


Add Manual Diff  Complete   07/20/22  04:32    


 


Total Counted  100   07/20/22  04:32    


 


Seg Neutrophils %  Np   07/20/22  04:32    


 


Seg Neuts % (Manual)  29.0 % (40.0-70.0)  L  07/20/22  04:32    


 


Band Neutrophils %  0 %  07/20/22  04:32    


 


Lymphocytes % (Manual)  58.0 % (13.4-35.0)  H  07/20/22  04:32    


 


Reactive Lymphs % (Man)  0 %  07/20/22  04:32    


 


Monocytes % (Manual)  10.0 % (0.0-7.3)  H  07/20/22  04:32    


 


Eosinophils % (Manual)  3.0 % (0.0-4.3)   07/20/22  04:32    


 


Basophils % (Manual)  0 % (0.0-1.8)   07/20/22  04:32    


 


Metamyelocytes %  0 %  07/20/22  04:32    


 


Myelocytes %  0 %  07/20/22  04:32    


 


Promyelocytes %  0 %  07/20/22  04:32    


 


Blast Cells %  0 %  07/20/22  04:32    


 


Nucleated RBC %  Not Reportable   07/20/22  04:32    


 


Seg Neutrophils #  1.8 K/mm3 (1.8-7.7)   07/19/22  06:04    


 


Seg Neutrophils # Man  1.5 K/mm3 (1.8-7.7)  L  07/20/22  04:32    


 


Band Neutrophils #  0.0 K/mm3  07/20/22  04:32    


 


Lymphocytes # (Manual)  3.0 K/mm3 (1.2-5.4)   07/20/22  04:32    


 


Abs React Lymphs (Man)  0.0 K/mm3  07/20/22  04:32    


 


Monocytes # (Manual)  0.5 K/mm3 (0.0-0.8)   07/20/22  04:32    


 


Eosinophils # (Manual)  0.2 K/mm3 (0.0-0.4)   07/20/22  04:32    


 


Basophils # (Manual)  0.0 K/mm3 (0.0-0.1)   07/20/22  04:32    


 


Metamyelocytes #  0.0 K/mm3  07/20/22  04:32    


 


Myelocytes #  0.0 K/mm3  07/20/22  04:32    


 


Promyelocytes #  0.0 K/mm3  07/20/22  04:32    


 


Blast Cells #  0.0 K/mm3  07/20/22  04:32    


 


WBC Morphology  Not Reportable   07/20/22  04:32    


 


Hypersegmented Neuts  Not Reportable   07/20/22  04:32    


 


Hyposegmented Neuts  Not Reportable   07/20/22  04:32    


 


Hypogranular Neuts  Not Reportable   07/20/22  04:32    


 


Smudge Cells  Not Reportable   07/20/22  04:32    


 


Toxic Granulation  Not Reportable   07/20/22  04:32    


 


Toxic Vacuolation  Not Reportable   07/20/22  04:32    


 


Dohle Bodies  Not Reportable   07/20/22  04:32    


 


Pelger-Huet Anomaly  Not Reportable   07/20/22  04:32    


 


Jorge Rods  Not Reportable   07/20/22  04:32    


 


Platelet Estimate  Consistent w auto   07/20/22  04:32    


 


Clumped Platelets  Not Reportable   07/20/22  04:32    


 


Plt Clumps, EDTA  Not Reportable   07/20/22  04:32    


 


Large Platelets  Not Reportable   07/20/22  04:32    


 


Giant Platelets  Not Reportable   07/20/22  04:32    


 


Platelet Satelliting  Not Reportable   07/20/22  04:32    


 


Plt Morphology Comment  Not Reportable   07/20/22  04:32    


 


RBC Morphology  Not Reportable   07/20/22  04:32    


 


Dimorphic RBCs  Not Reportable   07/20/22  04:32    


 


Polychromasia  Not Reportable   07/20/22  04:32    


 


Hypochromasia  Not Reportable   07/20/22  04:32    


 


Poikilocytosis  Not Reportable   07/20/22  04:32    


 


Anisocytosis  Not Reportable   07/20/22  04:32    


 


Microcytosis  Not Reportable   07/20/22  04:32    


 


Macrocytosis  Not Reportable   07/20/22  04:32    


 


Spherocytes  Not Reportable   07/20/22  04:32    


 


Pappenheimer Bodies  Not Reportable   07/20/22  04:32    


 


Sickle Cells  Not Reportable   07/20/22  04:32    


 


Target Cells  Not Reportable   07/20/22  04:32    


 


Tear Drop Cells  Not Reportable   07/20/22  04:32    


 


Ovalocytes  Not Reportable   07/20/22  04:32    


 


Helmet Cells  Not Reportable   07/20/22  04:32    


 


Araya-Collyer Bodies  Not Reportable   07/20/22  04:32    


 


Cabot Rings  Not Reportable   07/20/22  04:32    


 


Ailyn Cells  Not Reportable   07/20/22  04:32    


 


Bite Cells  Not Reportable   07/20/22  04:32    


 


Crenated Cell  Not Reportable   07/20/22  04:32    


 


Elliptocytes  Not Reportable   07/20/22  04:32    


 


Acanthocytes (Spur)  Not Reportable   07/20/22  04:32    


 


Rouleaux  Not Reportable   07/20/22  04:32    


 


Hemoglobin C Crystals  Not Reportable   07/20/22  04:32    


 


Schistocytes  Not Reportable   07/20/22  04:32    


 


Malaria parasites  Not Reportable   07/20/22  04:32    


 


Kulwinder Bodies  Not Reportable   07/20/22  04:32    


 


Hem Pathologist Commnt  No   07/20/22  04:32    


 


PT  12.1 Sec. (12.2-14.9)  L  07/20/22  04:32    


 


INR  0.82  (0.87-1.13)  L  07/20/22  04:32    


 


APTT  27.2 Sec. (24.2-36.6)   07/20/22  04:32    


 


Sodium  135 mmol/L (137-145)  L  07/20/22  04:32    


 


Potassium  4.3 mmol/L (3.6-5.0)   07/20/22  04:32    


 


Chloride  99.2 mmol/L ()   07/20/22  04:32    


 


Carbon Dioxide  25 mmol/L (22-30)   07/20/22  04:32    


 


Anion Gap  15 mmol/L  07/20/22  04:32    


 


BUN  10 mg/dL (9-20)   07/20/22  04:32    


 


Creatinine  0.7 mg/dL (0.8-1.3)  L  07/20/22  04:32    


 


Estimated GFR  > 60 ml/min  07/20/22  04:32    


 


BUN/Creatinine Ratio  14 %  07/20/22  04:32    


 


Glucose  92 mg/dL ()   07/20/22  04:32    


 


Calcium  9.0 mg/dL (8.4-10.2)   07/20/22  04:32    


 


Total Bilirubin  0.60 mg/dL (0.1-1.2)   07/19/22  06:04    


 


AST  29 units/L (5-40)   07/19/22  06:04    


 


ALT  15 units/L (7-56)   07/19/22  06:04    


 


Alkaline Phosphatase  57 units/L ()   07/19/22  06:04    


 


Troponin T  0.015 ng/mL (0.00-0.029)   07/20/22  04:32    


 


Total Protein  6.9 g/dL (6.3-8.2)   07/19/22  06:04    


 


Albumin  4.0 g/dL (3.9-5)   07/19/22  06:04    


 


Albumin/Globulin Ratio  1.4 %  07/19/22  06:04    


 


Triglycerides  87 mg/dL (2-149)   07/19/22  06:04    


 


Cholesterol  179 mg/dL ()   07/19/22  06:04    


 


LDL Cholesterol Direct  94 mg/dL ()   07/19/22  06:04    


 


HDL Cholesterol  83 mg/dL (40-59)  H  07/19/22  06:04    


 


Cholesterol/HDL Ratio  2.15 %  07/19/22  06:04    














Active Medications





- Current Medications


Current Medications: 














Generic Name Dose Route Start Last Admin





  Trade Name Freq  PRN Reason Stop Dose Admin


 


Acetaminophen  650 mg  07/19/22 22:49 





  Acetaminophen 325 Mg Tab  PO  





  Q4H PRN  





  Pain MILD(1-3)/Fever >100.5/HA  


 


Aspirin  325 mg  07/20/22 10:00  07/20/22 11:14





  Aspirin Ec 325 Mg Tab  PO   325 mg





  QDAY FIDELINA   Administration


 


Heparin Sodium/Sodium Chloride  25,000 unit in 500 mls @ 15 mls/hr  07/20/22 

03:00  07/20/22 04:44





  Heparin/ 0.45% Nacl-25,000 Unit/500 Ml  IV   750 units/hr





  TITRATE FIDELINA   15 mls/hr





    Administration





  Protocol  





  750 UNITS/HR  


 


Sodium Chloride  500 mls @ 50 mls/hr  07/20/22 11:00  07/20/22 11:18





  Nacl 0.9% 500 Ml  IV  07/20/22 20:59  50 mls/hr





  AS DIRECT FIDELINA   Administration


 


Magnesium Hydroxide  30 ml  07/19/22 22:49 





  Magnesium Hydroxide (Mom) Oral Liqd Udc  PO  





  Q4H PRN  





  Constipation  


 


Morphine Sulfate  2 mg  07/19/22 22:49 





  Morphine 2 Mg/1 Ml Inj  IV  





  Q4H PRN  





  Pain, Moderate (4-6)  


 


Morphine Sulfate  4 mg  07/19/22 22:49 





  Morphine 4 Mg/1 Ml Inj  IV  





  Q4H PRN  





  Pain , Severe (7-10)  


 


Morphine Sulfate  2 mg  07/19/22 22:49 





  Morphine 4 Mg/1 Ml Inj  IV  





  Q5MIN PRN  





  Chest Pain unrelieved by NTG  


 


Nitroglycerin  0.4 mg  07/19/22 22:49 





  Nitroglycerin 0.4 Mg Tab Subl  SL  





  Q5M PRN  





  Chest Pain  


 


Ondansetron HCl  4 mg  07/19/22 22:49 





  Ondansetron 4 Mg/2 Ml Inj  IV  





  Q8H PRN  





  Nausea And Vomiting  


 


Sodium Chloride  10 ml  07/20/22 10:00 





  Sodium Chloride 0.9% 10 Ml Flush Syringe  IV  





  BID FIDELINA  


 


Sodium Chloride  10 ml  07/19/22 22:49 





  Sodium Chloride 0.9% 10 Ml Flush Syringe  IV  





  PRN PRN  





  LINE FLUSH  


 


Tramadol HCl  50 mg  07/19/22 22:49 





  Tramadol 50 Mg Tab  PO  





  Q6H PRN  





  Pain, Moderate (4-6)

## 2022-07-20 NOTE — CARDIAC CATHERIZATION REPORT
DATE OF SERVICE: 07/20/2022



CARDIAC CATHETERIZATION REPORT



REASON FOR PROCEDURE:  The patient is a 52-year-old man with chronic tobacco 

abuse, admitted with chest pain.  His EKG showed deep anterior T-wave 

inversions, associated with mild elevation of the troponin levels.  We were 

concerned about a non-ST elevation myocardial infarction and anterior ischemia, 

a cardiac catheterization was recommended.



PROCEDURES:

1.  Left heart catheterization.

2.  Selective left and right coronary angiography.

3.  Left ventricular angiography.

4.  Sedation time start 1322, end 1334.



DESCRIPTION OF PROCEDURE:  The patient was prepped and draped in a sterile 

fashion after informed consent.  The right radial cath site was prepped and 

draped after a negative Dieter's test.  The right radial artery was entered using

Seldinger technique followed by placement of a 6-Upper sorbian hydrophilic sheath.  

Routine radial cocktail was administered via the sheath.



Selective left and right coronary angiography was performed using a #3.5 left 

Barber and a #4 right Barber.  A pigtail catheter was used for left 

ventricular angiography.



The catheters were then removed, sheaths removed, hemostasis achieved using a TR

band.  The patient was returned to the postprocedure unit in stable condition.  

There were no complications.



FINDINGS:



HEMODYNAMICS:  Left ventricular end-diastolic pressure was 13, following 

coronary angiography.  Ascending aortic pressure was 157/80.  There was no 

significant pressure gradient on pullback across the aortic valve.



CORONARY ANGIOGRAPHY:  The left main coronary artery was short and 

angiographically normal.



Left anterior descending artery and its diagonal branches were angiographically 

normal.  No significant lesions were noted in the LAD or diagonal branches.



There were mild irregularities of the circumflex system, this vessel was 

otherwise free of significant disease.



The right coronary artery was a dominant, and similarly angiographically normal.



There was mild hypokinesis of the anterior wall, but overall, left ventricular 

systolic function was well preserved, ejection fraction 50%.



CONCLUSIONS:

1.  No significant coronary artery disease, essentially, angiographically near 

normal coronary arteries.

2.  Left ventricular systolic function at the lower limits of normal, ejection 

fraction estimated at 50%, with very mild anterior wall hypokinesis.



RECOMMENDATIONS:  Medical therapy and risk factor modification.







DD: 07/20/2022 01:42 PM

DT: 07/20/2022 04:16 PM

TID: [id number] RECEIPT: [id number]

[facility]

## 2022-07-20 NOTE — CONSULTATION
History of Present Illness


Consult date: 07/20/22


Consult reason: chest pain


History of present illness: 





Patient is a 52-year-old man who is a chronic tobacco abuser, admitted with c

hest pain.  He describes a history of chest pain work-up 20 years ago, at [facility], otherwise, no recent cardiac history.





On his presentation to the emergency room, he had work-up including serial ECGs,

was admitted for rule out protocol and ordered to the stress lab for a stress 

test by the admitting medical service.





However my review of the ECG showed that the patient has deep anterior T wave 

changes, very suspicious for ischemic LAD disease.  As a result, I will cancel 

the stress test and instead proceed with a diagnostic coronary angiography.  

Risks and benefits discussed with the patient and he consents to proceed.





Past History


Past Medical History: other (Chronic tobacco abuse)


Past Surgical History: No surgical history


Social history: smoking (Current daily smoker)


Family history: no significant family history





Medications and Allergies


                                    Allergies











Allergy/AdvReac Type Severity Reaction Status Date / Time


 


No Known Allergies Allergy   Verified 07/27/21 21:57











                                Home Medications











 Medication  Instructions  Recorded  Confirmed  Last Taken  Type


 


Ketorolac [Toradol] 10 mg PO Q6H PRN #14 tablet 07/27/21  Unknown Rx











Active Meds: 


Active Medications





Acetaminophen (Acetaminophen 325 Mg Tab)  650 mg PO Q4H PRN


   PRN Reason: Pain MILD(1-3)/Fever >100.5/HA


Aspirin (Aspirin Ec 325 Mg Tab)  325 mg PO QDAY FIDELINA


Heparin Sodium/Sodium Chloride (Heparin/ 0.45% Nacl-25,000 Unit/500 Ml)  25,000 

unit in 500 mls @ 15 mls/hr IV TITRATE FIDELINA; Protocol


   Last Admin: 07/20/22 04:44 Dose:  750 units/hr, 15 mls/hr


   


Magnesium Hydroxide (Magnesium Hydroxide (Mom) Oral Liqd Udc)  30 ml PO Q4H PRN


   PRN Reason: Constipation


Morphine Sulfate (Morphine 2 Mg/1 Ml Inj)  2 mg IV Q4H PRN


   PRN Reason: Pain, Moderate (4-6)


Morphine Sulfate (Morphine 4 Mg/1 Ml Inj)  4 mg IV Q4H PRN


   PRN Reason: Pain , Severe (7-10)


Morphine Sulfate (Morphine 4 Mg/1 Ml Inj)  2 mg IV Q5MIN PRN


   PRN Reason: Chest Pain unrelieved by NTG


Nitroglycerin (Nitroglycerin 0.4 Mg Tab Subl)  0.4 mg SL Q5M PRN


   PRN Reason: Chest Pain


Ondansetron HCl (Ondansetron 4 Mg/2 Ml Inj)  4 mg IV Q8H PRN


   PRN Reason: Nausea And Vomiting


Sodium Chloride (Sodium Chloride 0.9% 10 Ml Flush Syringe)  10 ml IV BID FIDELINA


Sodium Chloride (Sodium Chloride 0.9% 10 Ml Flush Syringe)  10 ml IV PRN PRN


   PRN Reason: LINE FLUSH


Tramadol HCl (Tramadol 50 Mg Tab)  50 mg PO Q6H PRN


   PRN Reason: Pain, Moderate (4-6)











Review of Systems


Cardiovascular: chest pain, shortness of breath, no orthopnea, no palpitations, 

no rapid/irregular heart beat, no edema, no syncope, no lightheadedness





Physical Examination


                                   Vital Signs











Temp Pulse Resp BP Pulse Ox


 


 98.0 F   62   20   159/92   100 


 


 07/19/22 05:28  07/19/22 05:28  07/19/22 05:28  07/19/22 05:28 07/19/22 05:28











General appearance: no acute distress


HEENT: Positive: PERRL


Neck: Positive: neck supple


Cardiac: Positive: Reg Rate and Rhythm


Lungs: Positive: Decreased Breath Sounds


Neuro: Positive: Grossly Intact


Abdomen: Positive: Soft


Male genitourinary: Positive: deferred


Skin: Positive: Clear


Extremities: Absent: edema





Results





                                 07/20/22 04:32





                                 07/20/22 04:32


                                 Cardiac Enzymes











  07/19/22 Range/Units





  06:04 


 


AST  29  (5-40)  units/L








                                   Coagulation











  07/20/22 Range/Units





  04:32 


 


PT  12.1 L  (12.2-14.9)  Sec.


 


INR  0.82 L  (0.87-1.13)  


 


APTT  27.2  (24.2-36.6)  Sec.








                                     Lipids











  07/19/22 Range/Units





  06:04 


 


Triglycerides  87  (2-149)  mg/dL


 


Cholesterol  179  ()  mg/dL


 


HDL Cholesterol  83 H  (40-59)  mg/dL


 


Cholesterol/HDL Ratio  2.15  %








                                       CBC











  07/19/22 07/20/22 Range/Units





  23:07 04:32 


 


WBC  6.6  5.2  (4.5-11.0)  K/mm3


 


RBC  4.42  4.40  (3.65-5.03)  M/mm3


 


Hgb  14.8  14.5  (11.8-15.2)  gm/dl


 


Hct  43.6  42.8  (35.5-45.6)  %


 


Plt Count  271  245  (140-440)  K/mm3








                          Comprehensive Metabolic Panel











  07/19/22 07/19/22 07/20/22 Range/Units





  06:04 23:07 04:32 


 


Sodium  141  138  135 L  (137-145)  mmol/L


 


Potassium  4.0  4.7  4.3  (3.6-5.0)  mmol/L


 


Chloride  103.1  100.0  99.2  ()  mmol/L


 


Carbon Dioxide  23  25  25  (22-30)  mmol/L


 


BUN  8 L  9  10  (9-20)  mg/dL


 


Creatinine  0.8  0.8  0.7 L  (0.8-1.3)  mg/dL


 


Glucose  108 H  90  92  ()  mg/dL


 


Calcium  9.6  9.8  9.0  (8.4-10.2)  mg/dL


 


AST  29    (5-40)  units/L


 


ALT  15    (7-56)  units/L


 


Alkaline Phosphatase  57    ()  units/L


 


Total Protein  6.9    (6.3-8.2)  g/dL


 


Albumin  4.0    (3.9-5)  g/dL














EKG interpretations





- Telemetry


EKG Rhythm: Sinus Bradycardia (With deep anterior T wave inversions)





Assessment and Plan





- Patient Problems


(1) Unstable angina


Current Visit: Yes   Status: Acute   


Plan to address problem: 


52-year-old male with chronic tobacco abuse, presents with chest pain associated

with deep anterior T wave inversions on ECG, highly suspicious for acute LAD 

territory ischemia.  He was ordered for a stress test by the medical service 

which I will cancel and instead proceed with diagnostic coronary angiography.  

Risks and benefits discussed with the patient and he consents to proceed.

## 2022-07-20 NOTE — DISCHARGE SUMMARY
Providers





- Providers


Date of Admission: 


07/19/22 22:50





Date of discharge: 07/20/22


Attending physician: 


CAYLA CARTWRIGHT MD





                                        





07/19/22


Consult to Cardiac Rehabilitation [CONS] Routine 


   Reason For Exam: Phase I





07/19/22 22:50


Consult to Cardiology [CONS] Routine 


   Consulting Provider: ERIK REGALADO


   Reason For Exam: chest pain





07/20/22 13:50


Consult to Cardiac Rehabilitation [CONS] Routine 


   Reason For Exam: Cardiac Rehab Evaluation











Primary care physician: 


SINAI GOMEZ








Hospitalization


Reason for admission: ACS rule out


Condition: Stable


Hospital course: 





Patient is a 52-year-old male with a history of tobacco dependence who presented

 with chest pain.  He was admitted for ACS rule out.  Echocardiogram revealed 

mildly decreased ejection fraction of 45 to 50%.  Cardiology was consulted.  

Stress test was canceled for cardiac cath.  Cardiac cath revealed no significant

 coronary artery disease.  Patient was started on goal-directed medical therapy 

and discharged home in stable.


Disposition: 01 HOME / SELF CARE / HOMELESS


Final Discharge Diagnosis (Prints w/discharge instructions): Unstable angina.  

NSTEMI type II.  Tobacco dependence


Time spent for discharge: 30 minutes





Core Measure Documentation





- Palliative Care


Palliative Care/ Comfort Measures: Not Applicable





- Core Measures


Any of the following diagnoses?: heart failure





- Heart Failure Discharge Requirements


ACE/ARB for LVSD if EF <40%: Yes


Beta blocker at discharge: No


Reason for no beta blocker on DC: Bradycardia





Exam





- Physical Exam


Narrative exam: 





GENERAL: Thin male. In no acute distress.


HEENT: Normocephalic. Atraumatic. 


NECK: Supple.  


CHEST/LUNGS: CTAB on room air


HEART/CARDIOVASCULAR: RRR. No murmur, rubs or gallops appreciated.


ABDOMEN: +BS. NT/ND.


SKIN: No rashes noted.


NEURO:  No focal motor deficit.  Follows all commands.


MUSCULOSKELETAL: No joint effusion 


EXTREMITIES: R wrist pressure dressing in place. No cyanosis, clubbing or edema.


PSYCH: Cooperative.





- Constitutional


Vitals: 


                                        











Temp Pulse Resp BP Pulse Ox


 


 97.9 F   53 L  16   152/87   100 


 


 07/20/22 08:01  07/20/22 08:01  07/20/22 08:01  07/20/22 08:01  07/20/22 08:01














Plan


Care Plan Goals: 


Please follow-up with your primary care provider.  If you do not have one, 

please try to find one. We have referred you to a PCP affiliated with our 

hospital. You can schedule an appointment with them.


Please follow-up with Dr. Augustine within the next week. 


Please start taking all medications as a prescribed to you.


Follow up with: 


TRE AUGUSTINE MD [Staff Physician] - 7 Days


SINAI GOMEZ MD [Primary Care Provider] - 3-5 Days


Forms:  Work/School Release Form


Prescriptions: 


Aspirin EC [Halfprin EC] 81 mg PO QDAY 90 Days #90 tablet


ISOSORBIDE MONOnitrate [Imdur ER] 30 mg PO QDAY 90 Days #90 tablet


Clopidogrel [Plavix] 75 mg PO QDAY 90 Days #90 tablet


lisinopriL [Zestril TAB] 5 mg PO QDAY 90 Days #3090 tablet

## 2022-07-21 NOTE — ELECTROCARDIOGRAPH REPORT
[facility]

                                       

Test Date:    2022               Test Time:    07:01:38

Pat Name:     MARIBEL STAFFORD             Department:   

Patient ID:   [id number]          Room:         A485 1

Gender:       M                        Technician:   JEYSON

:          1970               Requested By: VAL BISHOP

Order Number: [id number]              Reading MD:   Santiago Cordoba

                                 Measurements

Intervals                              Axis          

Rate:         49                       P:            -38

MN:           106                      QRS:          70

QRSD:         95                       T:            113

QT:           539                                    

QTc:          487                                    

                           Interpretive Statements

Sinus bradycardia

T wave inversions, consider acute anterior ischemia

Compared to ECG 2022 22:01:01

No significant change

Electronically Signed On 2022 13:42:17 EDT by Santiago Cordoba

## 2022-07-21 NOTE — ELECTROCARDIOGRAPH REPORT
[facility]

                                       

Test Date:    2022               Test Time:    10:31:52

Pat Name:     MARIBEL STAFFORD             Department:   

Patient ID:   [id number]          Room:         A485 1

Gender:       M                        Technician:   JEYSON

:          1970               Requested By: VAL BISHOP

Order Number: [id number]              Reading MD:   Santiago Cordoba

                                 Measurements

Intervals                              Axis          

Rate:         50                       P:            -23

TN:           101                      QRS:          63

QRSD:         103                      T:            92

QT:           676                                    

QTc:          617                                    

                           Interpretive Statements

Sinus bradycardia

T wave inversions, consider anterior ischemia

Compared to ECG 2022 07:01:38

No significant change

Electronically Signed On 2022 13:43:52 EDT by Santiago Cordoba

## 2022-07-21 NOTE — ELECTROCARDIOGRAPH REPORT
[facility]

                                       

Test Date:    2022               Test Time:    22:01:01

Pat Name:     MARIBEL STAFFORD             Department:   

Patient ID:   [id number]          Room:         A485 1

Gender:       M                        Technician:   CADY

:          1970               Requested By: DEONNA OREILLY

Order Number: [id number]              Reading MD:   Santiago Cordoba

                                 Measurements

Intervals                              Axis          

Rate:         52                       P:            -24

NJ:           113                      QRS:          61

QRSD:         74                       T:            249

QT:           565                                    

QTc:          526                                    

                           Interpretive Statements

Sinus bradycardia

Deep T wave inversions consider acute anterior ischemia

Compared to ECG 2022 05:30:41

No significant change

Electronically Signed On 2022 13:39:59 EDT by Santiago Cordoba